# Patient Record
Sex: MALE | Race: WHITE | URBAN - METROPOLITAN AREA
[De-identification: names, ages, dates, MRNs, and addresses within clinical notes are randomized per-mention and may not be internally consistent; named-entity substitution may affect disease eponyms.]

---

## 2019-05-14 ENCOUNTER — LAB ENCOUNTER (OUTPATIENT)
Dept: LAB | Age: 29
End: 2019-05-14
Attending: NURSE PRACTITIONER
Payer: COMMERCIAL

## 2019-05-14 DIAGNOSIS — F41.1 ANXIETY STATE: ICD-10-CM

## 2019-05-14 DIAGNOSIS — R53.83 FATIGUE, UNSPECIFIED TYPE: ICD-10-CM

## 2019-05-14 DIAGNOSIS — R45.89 DEPRESSED MOOD: ICD-10-CM

## 2019-05-14 PROCEDURE — 82607 VITAMIN B-12: CPT | Performed by: NURSE PRACTITIONER

## 2019-05-14 PROCEDURE — 36415 COLL VENOUS BLD VENIPUNCTURE: CPT | Performed by: NURSE PRACTITIONER

## 2019-05-14 PROCEDURE — 82306 VITAMIN D 25 HYDROXY: CPT | Performed by: NURSE PRACTITIONER

## 2019-05-14 PROCEDURE — 80050 GENERAL HEALTH PANEL: CPT | Performed by: NURSE PRACTITIONER

## 2019-05-14 NOTE — PROGRESS NOTES
Joie Cortes is a 26year old male. HPI:   Patient presents today as a new patient to establish care and to discuss recent anxiety and panic attacks over the past 1 week. He denies any anxiety, depression or panic attacks in recent weeks/months.  Dinorah Nguyen denies headaches  Musculoskeletal: No motor deficits  PSYCHE: reports anxiety and panic attacks  EXAM:   /84   Pulse 78   Temp 98.4 °F (36.9 °C) (Oral)   Resp 16   Ht 69.5\"   Wt 182 lb   BMI 26.49 kg/m²   GENERAL: well developed, well nourished,in n driving or drinking alcohol while taking medication.  - TSH W REFLEX TO FREE T4; Future  - OP REFERRAL TO [facility]  - ALPRAZolam (XANAX) 0.25 MG Oral Tab; Take 1 tablet (0.25 mg total) by mouth 2 (two) times daily as needed for Anxiety.   Dispense: 30 table

## 2019-05-15 DIAGNOSIS — E53.8 B12 DEFICIENCY: Primary | ICD-10-CM

## 2019-05-21 ENCOUNTER — TELEPHONE (OUTPATIENT)
Dept: FAMILY MEDICINE CLINIC | Facility: CLINIC | Age: 29
End: 2019-05-21

## 2019-05-21 NOTE — TELEPHONE ENCOUNTER
Paperwork received. Forms will be completed at upcoming appointment(5/24/2019), per Edyta Ingram. Outgoing call to patient, left message to call back.   Paperwork in folder(Sunita)

## 2019-05-24 NOTE — PROGRESS NOTES
Kolton Wise is a 26year old male. HPI:   Patient presents today for a follow up on anxiety. Patient was seen in the office on 05/14/19 for anxiety and panic attacks.  Patient reports that he has been having less frequent panic attacks but feels his clear no erythema without mass.    EYES: PERRLA, EOM intact, sclera clear without injection  NECK: supple,no adenopathy, thyroid normal to palpation  LUNGS: clear to auscultation no rales, rhonchi or wheezes  CARDIO: RRR without murmur  EXTREMITIES: no cyan indicates understanding of these issues and agrees to the plan. The patient is asked to return in 3-4 weeks, sooner if any new/worsening symptoms.

## 2019-09-12 ENCOUNTER — OFFICE VISIT (OUTPATIENT)
Dept: OTHER | Facility: HOSPITAL | Age: 29
End: 2019-09-12
Attending: PREVENTIVE MEDICINE

## 2019-09-12 DIAGNOSIS — Z01.84 IMMUNITY STATUS TESTING: Primary | ICD-10-CM

## 2019-09-12 LAB
HBV SURFACE AB SER QL: NONREACTIVE
HBV SURFACE AB SERPL IA-ACNC: 4.76 MIU/ML
RUBV IGG SER QL: POSITIVE
RUBV IGG SER-ACNC: 117 IU/ML (ref 10–?)

## 2019-09-12 PROCEDURE — 86480 TB TEST CELL IMMUN MEASURE: CPT

## 2019-09-12 PROCEDURE — 86706 HEP B SURFACE ANTIBODY: CPT

## 2019-09-12 PROCEDURE — 86762 RUBELLA ANTIBODY: CPT

## 2019-09-12 PROCEDURE — 86765 RUBEOLA ANTIBODY: CPT

## 2019-09-12 PROCEDURE — 86735 MUMPS ANTIBODY: CPT

## 2019-09-12 PROCEDURE — 86787 VARICELLA-ZOSTER ANTIBODY: CPT

## 2019-09-13 LAB
MEV IGG SER-ACNC: 274 AU/ML (ref 30–?)
MUV IGG SER IA-ACNC: <5 AU/ML (ref 11–?)
VZV IGG SER IA-ACNC: 105.7 (ref 165–?)

## 2019-09-16 LAB
M TB TUBERC IFN-G BLD QL: NEGATIVE
M TB TUBERC IFN-G/MITOGEN IGNF BLD: 0.02 IU/ML
M TB TUBERC IFN-G/MITOGEN IGNF BLD: <0 IU/ML
M TB TUBERC IGNF/MITOGEN IGNF CONTROL: >10 IU/ML
MITOGEN IGNF BCKGRD COR BLD-ACNC: 0.03 IU/ML

## 2019-09-26 ENCOUNTER — APPOINTMENT (OUTPATIENT)
Dept: OTHER | Facility: HOSPITAL | Age: 29
End: 2019-09-26
Attending: PREVENTIVE MEDICINE

## (undated) NOTE — LETTER
Date: 5/24/2019    Patient Name: Aishwarya Jolley          To Whom it may concern: This letter has been written at the patient's request. The above patient was seen at the [facility] for treatment of a medical condition.     This patient